# Patient Record
Sex: MALE | Race: WHITE | URBAN - METROPOLITAN AREA
[De-identification: names, ages, dates, MRNs, and addresses within clinical notes are randomized per-mention and may not be internally consistent; named-entity substitution may affect disease eponyms.]

---

## 2019-10-02 ENCOUNTER — TELEPHONE (OUTPATIENT)
Dept: NEUROLOGY | Facility: CLINIC | Age: 39
End: 2019-10-02

## 2019-10-24 ENCOUNTER — TELEPHONE (OUTPATIENT)
Dept: NEUROLOGY | Facility: CLINIC | Age: 39
End: 2019-10-24

## 2019-10-24 ENCOUNTER — OFFICE VISIT (OUTPATIENT)
Dept: NEUROLOGY | Facility: CLINIC | Age: 39
End: 2019-10-24
Payer: COMMERCIAL

## 2019-10-24 VITALS
RESPIRATION RATE: 17 BRPM | BODY MASS INDEX: 23.74 KG/M2 | HEIGHT: 74 IN | DIASTOLIC BLOOD PRESSURE: 69 MMHG | WEIGHT: 185 LBS | SYSTOLIC BLOOD PRESSURE: 120 MMHG

## 2019-10-24 DIAGNOSIS — M25.462 EFFUSION OF LEFT KNEE: ICD-10-CM

## 2019-10-24 DIAGNOSIS — S83.002A PATELLAR SUBLUXATION, LEFT, INITIAL ENCOUNTER: ICD-10-CM

## 2019-10-24 DIAGNOSIS — M22.2X9 PATELLOFEMORAL PAIN SYNDROME, UNSPECIFIED LATERALITY: ICD-10-CM

## 2019-10-24 DIAGNOSIS — M25.562 ACUTE PAIN OF LEFT KNEE: Primary | ICD-10-CM

## 2019-10-24 DIAGNOSIS — M71.22 BAKER CYST, LEFT: ICD-10-CM

## 2019-10-24 PROCEDURE — 20611 DRAIN/INJ JOINT/BURSA W/US: CPT | Performed by: PHYSICAL MEDICINE & REHABILITATION

## 2019-10-24 PROCEDURE — 99202 OFFICE O/P NEW SF 15 MIN: CPT | Performed by: PHYSICAL MEDICINE & REHABILITATION

## 2019-10-24 NOTE — PROCEDURES
[address]   Knee Joint Injection Procedure Note    CHIEF COMPLAINT:  Patient presents with:  Knee Pain: Pt is present with left knee pain       PROCEDURE PERFORMED: Left knee intra-articular corticosteroid high-frequency linear probe was used to evaluate the posterior aspect of the knee and a Baker's cyst was seen. Using 1 cc of 1% lidocaine with a 30-gauge 1/2 inch needle, the skin and soft tissue was anesthetized.   Then an 18-gauge 1-1/2 inch needle was d

## 2019-10-24 NOTE — H&P
[address]  Progress Note    CHIEF COMPLAINT:  Patient presents with:  Knee Pain: Pt is present with left knee pain       History of Present Illness:   The patient is a 46year old right-handed male with a si Musculoskeletal: As per HPI  Skin: Negative. Neurological: As per HPI  Endo/Heme/Allergies: Negative. Psychiatric/Behavioral: Negative. All other systems reviewed and are negative. Pertinent positives and negatives noted in the HPI.         [facility] Imaging:    No image results found. ASSESSMENT AND PLAN:  The patient is a pleasant 44-year-old male who is coming in with a left knee effusion.   We performed a aspiration and corticosteroid injection as well as aspiration to the left Baker's cyst.  P

## 2019-10-24 NOTE — PATIENT INSTRUCTIONS
Get XR of the LEFT knee and then get MRI    Post Injection Instructions     1.  Please do not do anything strenuous over the next two days (if you had a knee injection do not walk more than 2 city blocks, do not attend any aerobic classes, do not run, no he

## 2019-10-24 NOTE — TELEPHONE ENCOUNTER
[employer] for authorization of approval for MRI left knee wo cpt code 88981. Approval was given with Authorization # [id number] effective 10/24/19 to 11/22/19. Will call Pt. To inform. Pt. Informed of approval. He will call to schedule appt.

## 2019-10-24 NOTE — PROGRESS NOTES
Location of pain: left knee  Rate your pain: 4  Timeline of pain: 9 month   Characterize the pain: constant pain    Worse with: movement  Better with: rest   Medications used:  Advil   Previous Injections: no   Previous Imaging: no  Previous Therapies for t

## 2019-10-24 NOTE — TELEPHONE ENCOUNTER
Called [facility] for authorization of approval for LEFT knee baker cyst aspiration and [facility] CPT code: 49986,25614,. Spoke to . who states no authorization is required. Reference call# [phone].  Will inform nursing

## 2019-10-28 RX ORDER — LIDOCAINE HYDROCHLORIDE 10 MG/ML
5 INJECTION, SOLUTION INFILTRATION; PERINEURAL ONCE
Status: COMPLETED | OUTPATIENT
Start: 2019-10-24 | End: 2019-10-24

## 2019-10-28 RX ORDER — TRIAMCINOLONE ACETONIDE 40 MG/ML
40 INJECTION, SUSPENSION INTRA-ARTICULAR; INTRAMUSCULAR ONCE
Status: COMPLETED | OUTPATIENT
Start: 2019-10-24 | End: 2019-10-24

## 2019-10-28 RX ORDER — LIDOCAINE HYDROCHLORIDE 10 MG/ML
4 INJECTION, SOLUTION INFILTRATION; PERINEURAL ONCE
Status: COMPLETED | OUTPATIENT
Start: 2019-10-24 | End: 2019-10-24

## 2019-10-28 RX ORDER — LIDOCAINE HYDROCHLORIDE 10 MG/ML
5 INJECTION, SOLUTION EPIDURAL; INFILTRATION; INTRACAUDAL; PERINEURAL ONCE
Status: SHIPPED | OUTPATIENT
Start: 2019-10-24